# Patient Record
Sex: MALE | Race: WHITE | NOT HISPANIC OR LATINO | URBAN - METROPOLITAN AREA
[De-identification: names, ages, dates, MRNs, and addresses within clinical notes are randomized per-mention and may not be internally consistent; named-entity substitution may affect disease eponyms.]

---

## 2023-04-03 ENCOUNTER — OFFICE VISIT (OUTPATIENT)
Dept: URGENT CARE | Facility: CLINIC | Age: 58
End: 2023-04-03
Payer: COMMERCIAL

## 2023-04-03 VITALS
OXYGEN SATURATION: 99 % | BODY MASS INDEX: 31.5 KG/M2 | HEIGHT: 70 IN | WEIGHT: 220 LBS | HEART RATE: 67 BPM | DIASTOLIC BLOOD PRESSURE: 68 MMHG | SYSTOLIC BLOOD PRESSURE: 123 MMHG | TEMPERATURE: 98 F | RESPIRATION RATE: 18 BRPM

## 2023-04-03 DIAGNOSIS — L03.011 PARONYCHIA OF RIGHT RING FINGER: Primary | ICD-10-CM

## 2023-04-03 PROCEDURE — 99203 OFFICE O/P NEW LOW 30 MIN: CPT | Mod: S$GLB,,, | Performed by: PHYSICIAN ASSISTANT

## 2023-04-03 PROCEDURE — 99203 PR OFFICE/OUTPT VISIT, NEW, LEVL III, 30-44 MIN: ICD-10-PCS | Mod: S$GLB,,, | Performed by: PHYSICIAN ASSISTANT

## 2023-04-03 RX ORDER — OMEPRAZOLE 40 MG/1
40 CAPSULE, DELAYED RELEASE ORAL EVERY MORNING
COMMUNITY
Start: 2023-03-10

## 2023-04-03 RX ORDER — PREDNISOLONE 15 MG/5ML
SOLUTION ORAL
COMMUNITY

## 2023-04-03 RX ORDER — MELOXICAM 15 MG/1
TABLET ORAL
COMMUNITY

## 2023-04-03 RX ORDER — SULFAMETHOXAZOLE AND TRIMETHOPRIM 800; 160 MG/1; MG/1
1 TABLET ORAL 2 TIMES DAILY
COMMUNITY
Start: 2023-03-27

## 2023-04-04 NOTE — PROGRESS NOTES
"Subjective:      Patient ID: Tyron Bolanos is a 57 y.o. male.    Vitals:  height is 5' 10" (1.778 m) and weight is 99.8 kg (220 lb). His temperature is 97.8 °F (36.6 °C). His blood pressure is 123/68 and his pulse is 67. His respiration is 18 and oxygen saturation is 99%.     Chief Complaint: Hand Pain    PT states for a week now he has been having right finger pain of ring finger it is now turning green.  He was seen at an alternative urgent care who started him on oral antibiotics and topical bactroban with no relief.  Denies fevers or drainage.    Hand Pain     Skin:  Positive for abscess. Negative for erythema.    Objective:     Physical Exam   Constitutional: He is oriented to person, place, and time. He appears well-developed.   HENT:   Head: Normocephalic and atraumatic. Head is without abrasion, without contusion and without laceration.   Ears:   Right Ear: External ear normal.   Left Ear: External ear normal.   Nose: Nose normal.   Mouth/Throat: Oropharynx is clear and moist and mucous membranes are normal.   Eyes: Conjunctivae, EOM and lids are normal. Pupils are equal, round, and reactive to light.   Neck: Trachea normal and phonation normal. Neck supple.   Cardiovascular: Normal rate, regular rhythm and normal heart sounds.   Pulmonary/Chest: Effort normal and breath sounds normal. No stridor. No respiratory distress.   Musculoskeletal: Normal range of motion.         General: Normal range of motion.        Hands:    Neurological: He is alert and oriented to person, place, and time.   Skin: Skin is warm, dry, intact and no rash. Capillary refill takes less than 2 seconds. No abrasion, No burn, No bruising, No erythema and No ecchymosis   Psychiatric: His speech is normal and behavior is normal. Judgment and thought content normal.   Nursing note and vitals reviewed.    Assessment:     1. Paronychia of right ring finger        Plan:       Paronychia of right ring finger    Successful drainage of " paronychia with an 18 gauge needle.  Advised to finish antibiotic  due to swelling into finger.  Warm compresses 2-3 times per day.  Tylenol and/or Ibuprofen as needed for pain.  RTC with new or worsening symptoms.

## 2023-04-07 ENCOUNTER — TELEPHONE (OUTPATIENT)
Dept: URGENT CARE | Facility: CLINIC | Age: 58
End: 2023-04-07
Payer: COMMERCIAL